# Patient Record
Sex: FEMALE | Race: WHITE | NOT HISPANIC OR LATINO | Employment: OTHER | URBAN - METROPOLITAN AREA
[De-identification: names, ages, dates, MRNs, and addresses within clinical notes are randomized per-mention and may not be internally consistent; named-entity substitution may affect disease eponyms.]

---

## 2017-02-28 ENCOUNTER — APPOINTMENT (OUTPATIENT)
Dept: OCCUPATIONAL MEDICINE | Facility: CLINIC | Age: 57
End: 2017-02-28

## 2017-02-28 PROCEDURE — 99000 SPECIMEN HANDLING OFFICE-LAB: CPT | Performed by: PHYSICIAN ASSISTANT

## 2018-03-19 ENCOUNTER — OFFICE VISIT (OUTPATIENT)
Dept: FAMILY MEDICINE | Facility: CLINIC | Age: 58
End: 2018-03-19
Payer: COMMERCIAL

## 2018-03-19 DIAGNOSIS — J20.9 ACUTE BRONCHITIS, UNSPECIFIED ORGANISM: Primary | ICD-10-CM

## 2018-03-19 PROCEDURE — 99213 OFFICE O/P EST LOW 20 MIN: CPT | Performed by: NURSE PRACTITIONER

## 2018-03-19 RX ORDER — ALBUTEROL SULFATE 90 UG/1
2 AEROSOL, METERED RESPIRATORY (INHALATION) EVERY 6 HOURS PRN
Qty: 1 INHALER | Refills: 0 | Status: SHIPPED | OUTPATIENT
Start: 2018-03-19

## 2018-03-19 RX ORDER — PREDNISONE 20 MG/1
TABLET ORAL
Qty: 10 TABLET | Refills: 0 | Status: SHIPPED | OUTPATIENT
Start: 2018-03-19 | End: 2023-02-13

## 2018-03-19 ASSESSMENT — MIFFLIN-ST. JEOR: SCORE: 1100.81

## 2018-03-19 NOTE — MR AVS SNAPSHOT
After Visit Summary   3/19/2018    Antoinette Nam    MRN: [id number]           Patient Information     Date Of Birth          1960        Visit Information        Provider Department      3/19/2018 4:40 PM Zuleyka Bridges APRN [facility]        Today's Diagnoses     Acute bronchitis, unspecified organism    -  1      Care Instructions      Bronchitis, Viral (Adult)    You have a viral bronchitis. Bronchitis is inflammation and swelling of the lining of the lungs. This is often caused by an infection. Symptoms include a dry, hacking cough that is worse at night. The cough may bring up yellow-green mucus. You may also feel short of breath or wheeze. Other symptoms may include tiredness, chest discomfort, and chills.  Bronchitis that is caused by a virus is not treated with antibiotics. Instead, medicines may be given to help relieve symptoms. Symptoms can last up to 2 weeks, although the cough may last much longer.  This illness is contagious during the first few days and is spread through the air by coughing and sneezing, or by direct contact (touching the sick person and then touching your own eyes, nose, or mouth).  Most viral illnesses resolve within 10 to 14 days with rest and simple home remedies, although they may sometimes last for several weeks.  Home care    If symptoms are severe, rest at home for the first 2 to 3 days. When you go back to your usual activities, don't let yourself get too tired.    Do not smoke. Also avoid being exposed to secondhand smoke.    You may use over-the-counter medicine to control fever or pain, unless another pain medicine was prescribed. (Note: If you have chronic liver or kidney disease or have ever had a stomach ulcer or gastrointestinal bleeding, talk with your healthcare provider before using these medicines. Also talk to your provider if you are taking medicine to prevent blood clots.) Aspirin should never be given to anyone  younger than 18 years of age who is ill with a viral infection or fever. It may cause severe liver or brain damage.    Your appetite may be poor, so a light diet is fine. Avoid dehydration by drinking 6 to 8 glasses of fluids per day (such as water, soft drinks, sports drinks, juices, tea, or soup). Extra fluids will help loosen secretions in the nose and lungs.    Over-the-counter cough, cold, and sore-throat medicines will not shorten the length of the illness, but they may help to reduce symptoms. (Note: Do not use decongestants if you have high blood pressure.)  Follow-up care  Follow up with your healthcare provider, or as advised. If you had an X-ray or ECG (electrocardiogram), a specialist will review it. You will be notified of any new findings that may affect your care.  Note: If you are age 65 or older, or if you have a chronic lung disease or condition that affects your immune system, or you smoke, talk to your healthcare provider about having pneumococcal vaccinations and a yearly influenza vaccination (flu shot).  When to seek medical advice  Call your healthcare provider right away if any of these occur:    Fever of 100.4 F (38 C) or higher    Coughing up increased amounts of colored sputum    Weakness, drowsiness, headache, facial pain, ear pain, or a stiff neck  Call 911, or get immediate medical care  Contact emergency services right away if any of these occur:    Coughing up blood    Worsening weakness, drowsiness, headache, or stiff neck    Trouble breathing, wheezing, or pain with breathing  Date Last Reviewed: 9/13/2015 2000-2017 [employer]. [address] [place] [address]. All rights reserved. This information is not intended as a substitute for professional medical care. Always follow your healthcare professional's instructions.                Follow-ups after your visit        Who to contact     If you have questions or need follow up information about today's  clinic visit or your schedule please [facility] at [phone].  Normal or non-critical lab and imaging results will be communicated to you by Buttonhart, letter or phone within 4 business days after the clinic has received the results. If you do not hear from us within 7 days, please contact the clinic through Touchtalentt or phone. If you have a critical or abnormal lab result, we will notify you by phone as soon as possible.  Submit refill requests through [employer] or call your pharmacy and they will forward the refill request to us. Please allow 3 business days for your refill to be completed.          Additional Information About Your Visit        ButtonharWineSimple Information     [employer] gives you secure access to your electronic health record. If you see a primary care provider, you can also send messages to your care team and make appointments. If you have questions, please call your primary care clinic.  If you do not have a primary care provider, please call [phone] and they will assist you.        Care EveryWhere ID     This is your Care EveryWhere ID. This could be used by other organizations to access your [facility] medical records  [id number]        Your Vitals Were     Pulse Temperature Respirations BMI (Body Mass Index)          72 97.6  F (36.4  C) (Tympanic) 22 22.32 kg/m2         Blood Pressure from Last 3 Encounters:   03/19/18 100/72   11/21/15 116/85   11/06/15 131/72    Weight from Last 3 Encounters:   03/19/18 126 lb (57.2 kg)   11/21/15 123 lb (55.8 kg)   05/04/14 125 lb (56.7 kg)              Today, you had the following     No orders found for display         Today's Medication Changes          These changes are accurate as of 3/19/18  5:04 PM.  If you have any questions, ask your nurse or doctor.               Start taking these medicines.        Dose/Directions    albuterol 108 (90 BASE) MCG/ACT Inhaler   Commonly known as:  PROAIR HFA/PROVENTIL HFA/VENTOLIN HFA    Used for:  Acute bronchitis, unspecified organism   Started by:  Zuleyka Bridges APRN CNP        Dose:  2 puff   Inhale 2 puffs into the lungs every 6 hours as needed for shortness of breath / dyspnea or wheezing   Quantity:  1 Inhaler   Refills:  0       predniSONE 20 MG tablet   Commonly known as:  DELTASONE   Used for:  Acute bronchitis, unspecified organism   Started by:  Zuleyka Bridges APRN CNP        Take one tablet twice a day for 5 days.   Quantity:  10 tablet   Refills:  0            Where to get your medicines      These medications were sent to [id number] - [facility] [address]  [address] [facility] [id number]    Hours:  Closed 10-16-08 business to [facility] Phone:  [phone]     albuterol 108 (90 BASE) MCG/ACT Inhaler    predniSONE 20 MG tablet                Primary Care Provider Office Phone # Fax #    Brita L Wegener [phone]       [facility] [address] [address]        Equal Access to Services     [facility]: Hadii keila ku hadasho Soomaali, waaxda luqadaha, qaybta kaalmada adeegyada, waxay christianoin emily hu . So [facility] [phone].    ATENCIÓN: Si habla español, tiene a rosales disposición servicios gratuitos de asistencia lingüística. Llame al [phone].    We comply with applicable federal civil rights laws and [place] laws. We do not discriminate on the basis of race, color, national origin, age, disability, sex, sexual orientation, or gender identity.            Thank you!     Thank you for choosing [facility]  for your care. Our goal is always to provide you with excellent care. Hearing back from our patients is one way we can continue to improve our services. Please take a few minutes to complete the written survey that you may receive in the mail after your visit with us. Thank you!             Your Updated Medication List - Protect others around you: Learn  how to safely use, store and throw away your medicines at www.disposemymeds.org.          This list is accurate as of 3/19/18  5:04 PM.  Always use your most recent med list.                   Brand Name Dispense Instructions for use Diagnosis    albuterol 108 (90 BASE) MCG/ACT Inhaler    PROAIR HFA/PROVENTIL HFA/VENTOLIN HFA    1 Inhaler    Inhale 2 puffs into the lungs every 6 hours as needed for shortness of breath / dyspnea or wheezing    Acute bronchitis, unspecified organism       BACLOFEN PO      Take 10 mg by mouth at onset of headache for muscle spasms        CELEBREX PO      Take by mouth at onset of headache for moderate pain        gabapentin 100 MG capsule    NEURONTIN    90 capsule    Take 2 capsules (200 mg) by mouth 3 times daily        ibuprofen 200 MG tablet    ADVIL/MOTRIN     800MG=4 TABLETS ORALLY 3 TIMES A DAY FOR 1 WEEK        predniSONE 20 MG tablet    DELTASONE    10 tablet    Take one tablet twice a day for 5 days.    Acute bronchitis, unspecified organism

## 2018-03-19 NOTE — PATIENT INSTRUCTIONS

## 2018-03-19 NOTE — PROGRESS NOTES
SUBJECTIVE:   Antoinette Nam is a 57 year old female who presents to clinic today for the following health issues:      Cough       Duration: Saturday night     Description (location/character/radiation): cough     Intensity:  mild, moderate    Accompanying signs and symptoms: wheezing, shortness of breath, burning in chest, phlegm at night, coughing is constant, chest feels heavy, sinus pressure and headache, nausea      History (similar episodes/previous evaluation): friend of the patient carved moose antlers. Patient inhaled bone dust.     Precipitating or alleviating factors: None    Therapies tried and outcome: cough syrup, albuterol nebulizer- helps a little       Problem list and histories reviewed & adjusted, as indicated.  Additional history: as documented    There is no problem list on file for this patient.    Past Surgical History:   Procedure Laterality Date     ARTHROSCOPY KNEE RT/LT  1986/1988     CARPAL TUNNEL RELEASE RT/LT  2004     HYSTERECTOMY, JAZZ  1997     LAMINECT/DISCECTOMY, LUMBAR  1997-C5 C6    Laminectomy/Discectomy Cervical     SALPINGO OOPHORECTOMY,R/L/EDMAR  1987    Salpingo Oophorectomy, RT/LT/EDMAR       Social History   Substance Use Topics     Smoking status: Current Every Day Smoker     Smokeless tobacco: Never Used      Comment: socially     Alcohol use Yes      Comment: wine cooler/beer once a month     Family History   Problem Relation Age of Onset     CANCER Mother      bladder     Arthritis Mother      degenerative     Hypertension Mother      HEART DISEASE Father      pacemaker     Lipids Father      CANCER Maternal Grandmother      gallbladder     HEART DISEASE Maternal Grandfather      CANCER Paternal Grandmother      uterine     HEART DISEASE Paternal Grandfather      Neurologic Disorder Brother      MS         Current Outpatient Prescriptions   Medication Sig Dispense Refill     BACLOFEN PO Take 10 mg by mouth at onset of headache for muscle spasms       Celecoxib  (CELEBREX PO) Take by mouth at onset of headache for moderate pain       gabapentin (NEURONTIN) 100 MG capsule Take 2 capsules (200 mg) by mouth 3 times daily (Patient not taking: Reported on 3/19/2018) 90 capsule 0     IBUPROFEN 200 MG PO TABS 800MG=4 TABLETS ORALLY 3 TIMES A DAY FOR 1 WEEK       Allergies   Allergen Reactions     Macrobid [Nitrofuran Derivatives]      Morphine      Percocet [Oxycodone-Acetaminophen] Nausea and Vomiting       Reviewed and updated as needed this visit by clinical staff       Reviewed and updated as needed this visit by Provider         ROS:  Constitutional, HEENT, cardiovascular, pulmonary, gi and gu systems are negative, except as otherwise noted.    OBJECTIVE:     /72  Pulse 72  Temp 97.6  F (36.4  C) (Tympanic)  Resp 22  Wt 126 lb (57.2 kg)  BMI 22.32 kg/m2  Body mass index is 22.32 kg/(m^2).  GENERAL: healthy, alert and no distress  EYES: Eyes grossly normal to inspection, PERRL and conjunctivae and sclerae normal  HENT: ear canals and TM's normal, nose and mouth without ulcers or lesions  NECK: no adenopathy, no asymmetry, masses, or scars and thyroid normal to palpation  RESP: lungs clear to auscultation - no rales, rhonchi or wheezes  CV: regular rate and rhythm, normal S1 S2, no S3 or S4, no murmur, click or rub, no peripheral edema and peripheral pulses strong  MS: no gross musculoskeletal defects noted, no edema  SKIN: no suspicious lesions or rashes  NEURO: Normal strength and tone, mentation intact and speech normal  PSYCH: mentation appears normal, affect normal/bright      ASSESSMENT/PLAN:       ICD-10-CM    1. Acute bronchitis, unspecified organism J20.9 predniSONE (DELTASONE) 20 MG tablet     albuterol (PROAIR HFA/PROVENTIL HFA/VENTOLIN HFA) 108 (90 BASE) MCG/ACT Inhaler     Patient Instructions     Bronchitis, Viral (Adult)    You have a viral bronchitis. Bronchitis is inflammation and swelling of the lining of the lungs. This is often caused by an  infection. Symptoms include a dry, hacking cough that is worse at night. The cough may bring up yellow-green mucus. You may also feel short of breath or wheeze. Other symptoms may include tiredness, chest discomfort, and chills.  Bronchitis that is caused by a virus is not treated with antibiotics. Instead, medicines may be given to help relieve symptoms. Symptoms can last up to 2 weeks, although the cough may last much longer.  This illness is contagious during the first few days and is spread through the air by coughing and sneezing, or by direct contact (touching the sick person and then touching your own eyes, nose, or mouth).  Most viral illnesses resolve within 10 to 14 days with rest and simple home remedies, although they may sometimes last for several weeks.  Home care    If symptoms are severe, rest at home for the first 2 to 3 days. When you go back to your usual activities, don't let yourself get too tired.    Do not smoke. Also avoid being exposed to secondhand smoke.    You may use over-the-counter medicine to control fever or pain, unless another pain medicine was prescribed. (Note: If you have chronic liver or kidney disease or have ever had a stomach ulcer or gastrointestinal bleeding, talk with your healthcare provider before using these medicines. Also talk to your provider if you are taking medicine to prevent blood clots.) Aspirin should never be given to anyone younger than 18 years of age who is ill with a viral infection or fever. It may cause severe liver or brain damage.    Your appetite may be poor, so a light diet is fine. Avoid dehydration by drinking 6 to 8 glasses of fluids per day (such as water, soft drinks, sports drinks, juices, tea, or soup). Extra fluids will help loosen secretions in the nose and lungs.    Over-the-counter cough, cold, and sore-throat medicines will not shorten the length of the illness, but they may help to reduce symptoms. (Note: Do not use decongestants if  you have high blood pressure.)  Follow-up care  Follow up with your healthcare provider, or as advised. If you had an X-ray or ECG (electrocardiogram), a specialist will review it. You will be notified of any new findings that may affect your care.  Note: If you are age 65 or older, or if you have a chronic lung disease or condition that affects your immune system, or you smoke, talk to your healthcare provider about having pneumococcal vaccinations and a yearly influenza vaccination (flu shot).  When to seek medical advice  Call your healthcare provider right away if any of these occur:    Fever of 100.4 F (38 C) or higher    Coughing up increased amounts of colored sputum    Weakness, drowsiness, headache, facial pain, ear pain, or a stiff neck  Call 911, or get immediate medical care  Contact emergency services right away if any of these occur:    Coughing up blood    Worsening weakness, drowsiness, headache, or stiff neck    Trouble breathing, wheezing, or pain with breathing  Date Last Reviewed: 9/13/2015 2000-2017 [employer]. [address] [place] [address]. All rights reserved. This information is not intended as a substitute for professional medical care. Always follow your healthcare professional's instructions.            AXEL [facility]

## 2018-04-24 ENCOUNTER — TELEPHONE (OUTPATIENT)
Dept: FAMILY MEDICINE | Facility: CLINIC | Age: 58
End: 2018-04-24

## 2018-04-24 NOTE — TELEPHONE ENCOUNTER
Reviewing charts. Patient is due for a mammogram and a colon cancer screening.    Abstracted mammogram done at [facility] on 5/17/17.    Sara Carballo MA

## 2019-07-01 VITALS
HEART RATE: 72 BPM | SYSTOLIC BLOOD PRESSURE: 100 MMHG | HEIGHT: 62 IN | TEMPERATURE: 97.6 F | WEIGHT: 126 LBS | BODY MASS INDEX: 23.19 KG/M2 | RESPIRATION RATE: 22 BRPM | DIASTOLIC BLOOD PRESSURE: 72 MMHG

## 2019-10-04 ENCOUNTER — HEALTH MAINTENANCE LETTER (OUTPATIENT)
Age: 59
End: 2019-10-04

## 2020-11-14 ENCOUNTER — HEALTH MAINTENANCE LETTER (OUTPATIENT)
Age: 60
End: 2020-11-14

## 2021-09-12 ENCOUNTER — HEALTH MAINTENANCE LETTER (OUTPATIENT)
Age: 61
End: 2021-09-12

## 2021-11-07 ENCOUNTER — HEALTH MAINTENANCE LETTER (OUTPATIENT)
Age: 61
End: 2021-11-07

## 2022-01-02 ENCOUNTER — HEALTH MAINTENANCE LETTER (OUTPATIENT)
Age: 62
End: 2022-01-02

## 2022-02-09 ENCOUNTER — HOSPITAL ENCOUNTER (OUTPATIENT)
Dept: CT IMAGING | Facility: CLINIC | Age: 62
Discharge: HOME | End: 2022-02-09
Attending: PHYSICIAN ASSISTANT | Admitting: PHYSICIAN ASSISTANT
Payer: COMMERCIAL

## 2022-02-09 DIAGNOSIS — R42 DIZZINESS: ICD-10-CM

## 2022-02-09 PROCEDURE — 70450 CT HEAD/BRAIN W/O DYE: CPT

## 2022-02-27 ENCOUNTER — HEALTH MAINTENANCE LETTER (OUTPATIENT)
Age: 62
End: 2022-02-27

## 2022-11-19 ENCOUNTER — HEALTH MAINTENANCE LETTER (OUTPATIENT)
Age: 62
End: 2022-11-19

## 2023-02-11 ENCOUNTER — HOSPITAL ENCOUNTER (EMERGENCY)
Facility: CLINIC | Age: 63
Discharge: HOME | End: 2023-02-11
Payer: COMMERCIAL

## 2023-02-11 VITALS
OXYGEN SATURATION: 98 % | BODY MASS INDEX: 23.41 KG/M2 | WEIGHT: 124 LBS | HEIGHT: 61 IN | DIASTOLIC BLOOD PRESSURE: 83 MMHG | SYSTOLIC BLOOD PRESSURE: 119 MMHG | HEART RATE: 108 BPM | RESPIRATION RATE: 20 BRPM | TEMPERATURE: 98.2 F

## 2023-02-11 ASSESSMENT — ACTIVITIES OF DAILY LIVING (ADL)
ADLS_ACUITY_SCORE: 35
ADLS_ACUITY_SCORE: 35

## 2023-02-11 NOTE — ED NOTES
Patient presented to the urgent care for complaints of ear pain and dizziness.  Patient states that yesterday she had severe dizziness with vomiting.  I discussed with the patient that I cannot exclusively rule out more significant or serious causes for dizziness such as stroke in the urgent care due to the limited resources.  Patient states that she was hoping for more thorough evaluation than what the urgent care can provide.  Patient states she would like to be seen in the emergency department for further evaluation.  Patient was moved out to the emergency department waiting room.      Kash Hernandez, AXEL CNP  02/11/23 [id number]

## 2023-02-13 ENCOUNTER — APPOINTMENT (OUTPATIENT)
Dept: GENERAL RADIOLOGY | Facility: CLINIC | Age: 63
End: 2023-02-13
Attending: EMERGENCY MEDICINE
Payer: COMMERCIAL

## 2023-02-13 ENCOUNTER — HOSPITAL ENCOUNTER (EMERGENCY)
Facility: CLINIC | Age: 63
Discharge: HOME | End: 2023-02-13
Attending: EMERGENCY MEDICINE | Admitting: EMERGENCY MEDICINE
Payer: COMMERCIAL

## 2023-02-13 VITALS
WEIGHT: 124 LBS | TEMPERATURE: 98.2 F | DIASTOLIC BLOOD PRESSURE: 85 MMHG | SYSTOLIC BLOOD PRESSURE: 106 MMHG | RESPIRATION RATE: 16 BRPM | HEIGHT: 61 IN | HEART RATE: 72 BPM | BODY MASS INDEX: 23.41 KG/M2 | OXYGEN SATURATION: 97 %

## 2023-02-13 DIAGNOSIS — R07.9 CHEST PAIN, UNSPECIFIED TYPE: ICD-10-CM

## 2023-02-13 DIAGNOSIS — J20.9 ACUTE BRONCHITIS, UNSPECIFIED ORGANISM: ICD-10-CM

## 2023-02-13 DIAGNOSIS — H93.8X2 SENSATION OF FULLNESS IN LEFT EAR: ICD-10-CM

## 2023-02-13 LAB
ALBUMIN SERPL BCG-MCNC: 4.1 G/DL (ref 3.5–5.2)
ALP SERPL-CCNC: 100 U/L (ref 35–104)
ALT SERPL W P-5'-P-CCNC: 11 U/L (ref 10–35)
ANION GAP SERPL CALCULATED.3IONS-SCNC: 11 MMOL/L (ref 7–15)
AST SERPL W P-5'-P-CCNC: 21 U/L (ref 10–35)
BASOPHILS # BLD MANUAL: 0 10E3/UL (ref 0–0.2)
BASOPHILS NFR BLD MANUAL: 0 %
BILIRUB SERPL-MCNC: 0.2 MG/DL
BUN SERPL-MCNC: 13.7 MG/DL (ref 8–23)
CALCIUM SERPL-MCNC: 9.1 MG/DL (ref 8.8–10.2)
CHLORIDE SERPL-SCNC: 106 MMOL/L (ref 98–107)
CREAT SERPL-MCNC: 0.83 MG/DL (ref 0.51–0.95)
DEPRECATED HCO3 PLAS-SCNC: 24 MMOL/L (ref 22–29)
EOSINOPHIL # BLD MANUAL: 0.2 10E3/UL (ref 0–0.7)
EOSINOPHIL NFR BLD MANUAL: 3 %
ERYTHROCYTE [DISTWIDTH] IN BLOOD BY AUTOMATED COUNT: 13.4 % (ref 10–15)
GFR SERPL CREATININE-BSD FRML MDRD: 79 ML/MIN/1.73M2
GLUCOSE SERPL-MCNC: 106 MG/DL (ref 70–99)
HCT VFR BLD AUTO: 37.1 % (ref 35–47)
HGB BLD-MCNC: 12.8 G/DL (ref 11.7–15.7)
HOLD SPECIMEN: NORMAL
HOLD SPECIMEN: NORMAL
LIPASE SERPL-CCNC: 20 U/L (ref 13–60)
LYMPHOCYTES # BLD MANUAL: 3.3 10E3/UL (ref 0.8–5.3)
LYMPHOCYTES NFR BLD MANUAL: 44 %
MCH RBC QN AUTO: 31.6 PG (ref 26.5–33)
MCHC RBC AUTO-ENTMCNC: 34.5 G/DL (ref 31.5–36.5)
MCV RBC AUTO: 92 FL (ref 78–100)
MONOCYTES # BLD MANUAL: 1.1 10E3/UL (ref 0–1.3)
MONOCYTES NFR BLD MANUAL: 14 %
NEUTROPHILS # BLD MANUAL: 2.9 10E3/UL (ref 1.6–8.3)
NEUTROPHILS NFR BLD MANUAL: 39 %
NT-PROBNP SERPL-MCNC: 43 PG/ML (ref 0–900)
PLAT MORPH BLD: ABNORMAL
PLATELET # BLD AUTO: 288 10E3/UL (ref 150–450)
POTASSIUM SERPL-SCNC: 4 MMOL/L (ref 3.4–5.3)
PROT SERPL-MCNC: 7.2 G/DL (ref 6.4–8.3)
RBC # BLD AUTO: 4.05 10E6/UL (ref 3.8–5.2)
RBC MORPH BLD: ABNORMAL
SODIUM SERPL-SCNC: 141 MMOL/L (ref 136–145)
TROPONIN T SERPL HS-MCNC: 9 NG/L
TROPONIN T SERPL HS-MCNC: <6 NG/L
VARIANT LYMPHS BLD QL SMEAR: PRESENT
WBC # BLD AUTO: 7.5 10E3/UL (ref 4–11)

## 2023-02-13 PROCEDURE — 83690 ASSAY OF LIPASE: CPT | Performed by: EMERGENCY MEDICINE

## 2023-02-13 PROCEDURE — 83880 ASSAY OF NATRIURETIC PEPTIDE: CPT | Performed by: EMERGENCY MEDICINE

## 2023-02-13 PROCEDURE — 71046 X-RAY EXAM CHEST 2 VIEWS: CPT

## 2023-02-13 PROCEDURE — 80053 COMPREHEN METABOLIC PANEL: CPT | Performed by: EMERGENCY MEDICINE

## 2023-02-13 PROCEDURE — 84484 ASSAY OF TROPONIN QUANT: CPT | Performed by: EMERGENCY MEDICINE

## 2023-02-13 PROCEDURE — 85007 BL SMEAR W/DIFF WBC COUNT: CPT | Performed by: EMERGENCY MEDICINE

## 2023-02-13 PROCEDURE — 93005 ELECTROCARDIOGRAM TRACING: CPT | Performed by: EMERGENCY MEDICINE

## 2023-02-13 PROCEDURE — 99285 EMERGENCY DEPT VISIT HI MDM: CPT | Mod: 25 | Performed by: EMERGENCY MEDICINE

## 2023-02-13 PROCEDURE — 36415 COLL VENOUS BLD VENIPUNCTURE: CPT | Performed by: EMERGENCY MEDICINE

## 2023-02-13 PROCEDURE — 99284 EMERGENCY DEPT VISIT MOD MDM: CPT | Mod: 25 | Performed by: EMERGENCY MEDICINE

## 2023-02-13 PROCEDURE — 93010 ELECTROCARDIOGRAM REPORT: CPT | Performed by: EMERGENCY MEDICINE

## 2023-02-13 PROCEDURE — 85027 COMPLETE CBC AUTOMATED: CPT | Performed by: EMERGENCY MEDICINE

## 2023-02-13 RX ORDER — PREDNISONE 20 MG/1
TABLET ORAL
Qty: 10 TABLET | Refills: 0 | Status: SHIPPED | OUTPATIENT
Start: 2023-02-13

## 2023-02-13 ASSESSMENT — ACTIVITIES OF DAILY LIVING (ADL)
ADLS_ACUITY_SCORE: 35
ADLS_ACUITY_SCORE: 35

## 2023-02-13 NOTE — ED TRIAGE NOTES
"Pt states she is having burning in the center of her chest for 2 months.  Was on prednisone, and antibiotic for ear \"plugged\".   Pt didn't follow up.  Pt states Friday felt \"floaty' while driving her car, and then vomited.  Pt was here on Saturday and left before being seen.      "

## 2023-02-13 NOTE — ED PROVIDER NOTES
"  History     Chief Complaint   Patient presents with     Ear Fullness     Pleurisy     Chest \"burning\"     HPI  Antoinette Nam is a 62 year old female with a past medical history significant for occipital neuralgia hyperlipidemia lower back pain who presents emergency department complaining of ear fullness episode of dizziness and cough with chest pressure going on for the last 2 months.  Patient states she has had ear issues going on for the past 2 months and also has developed some chest pressure that has been present described as a burning sensation for the past month.  Does not change with activity has had a mild nonproductive cough.  Also had fullness in ears especially on the left patient was seen by her primary care clinic on the 30th and given a Zithromax and steroids.  She says this did not do much for her.  She continues to have the ear fullness and cough.  She she is also having the chest burning.  Patient on Friday was driving home and had the sudden onset of dizziness and pulled over.  She was able to get home but had significant spinning and vomited several times and felt dizziness for several hours after this she went to bed and woke up and was feeling better Saturday and has not had a return of this.  She denies any severe headaches or visual changes at this time she is having a mild burning in her chest denies shortness of breath has not had a productive cough the cough is nonproductive.  She denies any abdominal pain back pain focal numbness weakness in any extremity she has not had bowel or bladder dysfunction.  She denies any calf pain.  She has not had a rash.    Allergies:  Allergies   Allergen Reactions     Macrobid [Nitrofuran Derivatives]      Morphine      Percocet [Oxycodone-Acetaminophen] Nausea and Vomiting       Problem List:    There are no problems to display for this patient.       Past Medical History:    No past medical history on file.    Past Surgical History:    Past Surgical " "History:   Procedure Laterality Date     ARTHROSCOPY KNEE RT/LT  1986/1988     CARPAL TUNNEL RELEASE RT/LT  2004     HYSTERECTOMY, JAZZ  1997     LAMINECT/DISCECTOMY, LUMBAR  1997-C5 C6    Laminectomy/Discectomy Cervical     SALPINGO OOPHORECTOMY,R/L/EDMAR  1987    Salpingo Oophorectomy, RT/LT/EDMAR       Family History:    Family History   Problem Relation Age of Onset     Cancer Mother         bladder     Arthritis Mother         degenerative     Hypertension Mother      Heart Disease Father         pacemaker     Lipids Father      Cancer Maternal Grandmother         gallbladder     Heart Disease Maternal Grandfather      Cancer Paternal Grandmother         uterine     Heart Disease Paternal Grandfather      Neurologic Disorder Brother         MS       Social History:  Marital Status:  Single [1]  Social History     Tobacco Use     Smoking status: Some Days     Types: Cigarettes     Smokeless tobacco: Never     Tobacco comments:     socially   Substance Use Topics     Alcohol use: Yes     Comment: wine cooler/beer once a month        Medications:    albuterol (PROAIR HFA/PROVENTIL HFA/VENTOLIN HFA) 108 (90 BASE) MCG/ACT Inhaler  BACLOFEN PO  Celecoxib (CELEBREX PO)  gabapentin (NEURONTIN) 100 MG capsule  IBUPROFEN 200 MG PO TABS  predniSONE (DELTASONE) 20 MG tablet          Review of Systems  All systems reviewed and other than pertinent positives and negatives in HPI all other systems are negative.  Physical Exam   BP: 111/77  Pulse: 89  Temp: 98.2  F (36.8  C)  Resp: 16  Height: 154.9 cm (5' 1\")  Weight: 56.2 kg (124 lb)  SpO2: 96 %      Physical Exam  Vitals and nursing note reviewed.   Constitutional:       General: She is not in acute distress.     Appearance: Normal appearance. She is not ill-appearing, toxic-appearing or diaphoretic.   HENT:      Right Ear: Tympanic membrane normal.      Ears:      Comments: Left TM has fluid behind it but no erythema or dullness.  Canals are normal bilaterally.     Nose: Nose " normal.      Mouth/Throat:      Mouth: Mucous membranes are moist.      Pharynx: Oropharynx is clear.   Eyes:      Extraocular Movements: Extraocular movements intact.      Conjunctiva/sclera: Conjunctivae normal.   Cardiovascular:      Rate and Rhythm: Normal rate and regular rhythm.      Pulses: Normal pulses.      Heart sounds: Normal heart sounds. No murmur heard.  Pulmonary:      Effort: Pulmonary effort is normal.      Breath sounds: Normal breath sounds. No stridor. No wheezing or rhonchi.   Abdominal:      General: Abdomen is flat. Bowel sounds are normal. There is no distension.      Palpations: Abdomen is soft.      Tenderness: There is no abdominal tenderness. There is no right CVA tenderness or left CVA tenderness.   Musculoskeletal:         General: No swelling or tenderness. Normal range of motion.      Cervical back: Normal range of motion and neck supple.      Right lower leg: No edema.      Left lower leg: No edema.   Skin:     General: Skin is warm and dry.      Capillary Refill: Capillary refill takes less than 2 seconds.      Findings: No rash.   Neurological:      General: No focal deficit present.      Mental Status: She is alert and oriented to person, place, and time.      Cranial Nerves: No cranial nerve deficit.      Sensory: No sensory deficit.      Motor: No weakness.      Coordination: Coordination normal.   Psychiatric:         Mood and Affect: Mood normal.         ED Course                 Procedures              EKG Interpretation:      Interpreted by Jose C Farr MD  Rhythm: normal sinus   Rate: normal  Axis: normal  Ectopy: none  Conduction: normal  ST Segments/ T Waves: No ST-T wave changes  Q Waves: none  Comparison to prior: Unchanged    Clinical Impression: normal EKG          Critical Care time:  none               Labs Ordered and Resulted from Time of ED Arrival to Time of ED Departure   COMPREHENSIVE METABOLIC PANEL - Abnormal       Result Value    Sodium 141       Potassium 4.0      Chloride 106      Carbon Dioxide (CO2) 24      Anion Gap 11      Urea Nitrogen 13.7      Creatinine 0.83      Calcium 9.1      Glucose 106 (*)     Alkaline Phosphatase 100      AST 21      ALT 11      Protein Total 7.2      Albumin 4.1      Bilirubin Total 0.2      GFR Estimate 79     DIFFERENTIAL - Abnormal    % Neutrophils 39      % Lymphocytes 44      % Monocytes 14      % Eosinophils 3      % Basophils 0      Absolute Neutrophils 2.9      Absolute Lymphocytes 3.3      Absolute Monocytes 1.1      Absolute Eosinophils 0.2      Absolute Basophils 0.0      RBC Morphology Confirmed RBC Indices      Platelet Assessment        Value: Automated Count Confirmed. Platelet morphology is normal.    Reactive Lymphocytes Present (*)    LIPASE - Normal    Lipase 20     TROPONIN T, HIGH SENSITIVITY - Normal    Troponin T, High Sensitivity 9     NT PROBNP INPATIENT - Normal    N terminal Pro BNP Inpatient 43     CBC WITH PLATELETS AND DIFFERENTIAL - Normal    WBC Count 7.5      RBC Count 4.05      Hemoglobin 12.8      Hematocrit 37.1      MCV 92      MCH 31.6      MCHC 34.5      RDW 13.4      Platelet Count 288     TROPONIN T, HIGH SENSITIVITY - Normal    Troponin T, High Sensitivity <6         Medications - No data to display  Results for orders placed or performed during the hospital encounter of 02/13/23   Chest XR,  PA & LAT    Narrative    EXAM: XR CHEST 2 VIEWS  LOCATION: [facility]  DATE/TIME: 2/13/2023 6:58 PM    INDICATION: Chest pressure.  COMPARISON: None.      Impression    IMPRESSION: Negative chest.       Assessments & Plan (with Medical Decision Making) records from recent visit with primary care were reviewed as well as past medical history medications.  EKG and labs were obtained.  EKG was unremarkable.  CBC without significant abnormality.  Lipase was 20.  proBNP 43.  Troponin less than 6.  A chest x-ray was obtained and revealed no obvious acute abnormality.  I  reviewed this myself.  Patient was observed for some time she is not wheezing at this time she has an inhaler at home.  She definitely has some fluid behind her left ear and is congested.  The episode she had on Friday sounds like a vertigo situation.  Patient has previously been on steroids and states this does help clear up some of her chest difficulties I feel she should continue to try over-the-counter medications for some time and if this does not work try the steroids.  I am going to refer her to ENT for further evaluation of the fluid behind her ears.  If any further chest pain shortness of breath weakness fevers or other symptoms patient should immediately return for further evaluation and care.  Patient is comfortable with this plan.     I have reviewed the nursing notes.    I have reviewed the findings, diagnosis, plan and need for follow up with the patient.         Discharge Medication List as of 2/13/2023  9:21 PM          Final diagnoses:   Sensation of fullness in left ear   Chest pain, unspecified type       2/13/2023   [facility] EMERGENCY DEPT     Chika, Jose C Fernandes MD  02/15/23 0913

## 2023-02-14 NOTE — DISCHARGE INSTRUCTIONS
Return if symptoms worsen or new symptoms develop.  Follow-up with primary care physician next available.  Drink plenty of fluids.  Return if symptoms worsen or new symptoms develop.  Follow-up with primary care physician next available.  Drink plenty of fluids.  If increased fever dizziness worsening congestion chest pain persistent or other symptoms present please return for recheck follow-up with your primary care for further evaluation this week and I have made a referral to ENT.  No evidence of obvious bacterial infection is present.

## 2023-02-15 ENCOUNTER — TELEPHONE (OUTPATIENT)
Dept: OTOLARYNGOLOGY | Facility: CLINIC | Age: 63
End: 2023-02-15

## 2023-02-15 NOTE — TELEPHONE ENCOUNTER
"Scheduled to see Dr. Wise + Audio on 6/5/23. We do not have any openings in 1-2 weeks.    Patient seen in the ED on 2/13/23.    Has had ear issues x 2 months per patient report: ear fullness, dizziness, 1 episode of repetitive vomiting.    \"Patient on Friday was driving home and had the sudden onset of dizziness and pulled over.  She was able to get home but had significant spinning and vomited several times and felt dizziness for several hours after this she went to bed and woke up and was feeling better Saturday and has not had a return of this.\"    Will route to [facility] and see if they would see patient for possible vestibular issues.    Yoselin Cohen RN on 2/15/2023 at 12:40 PM    "

## 2023-02-15 NOTE — TELEPHONE ENCOUNTER
[facility] Call Center    Phone Message    May a detailed message be left on voicemail: yes     Reason for Call: Appointment Intake    Referring Provider Name: Jose C Farr MD  Diagnosis and/or Symptoms: Sensation of fullness in left ear [H93.8X2] Priority: 1-2 Weeks    Referral states priority 1-2 weeks, added patient to waitlist     Action Taken: Message routed to:  Other: [facility] [facility]    Travel Screening: Not Applicable

## 2023-02-16 NOTE — TELEPHONE ENCOUNTER
2/16 LVM to call back to schedule sooner appointment?  She can always separate appointments to get in sooner with Dr. Reid.  Dr. Reid has new vestibular spots on her schedule that are open.  So all she would need is that audio first for the dizziness.   LT

## 2023-02-18 ENCOUNTER — HOSPITAL ENCOUNTER (EMERGENCY)
Facility: CLINIC | Age: 63
Discharge: HOME | End: 2023-02-19
Attending: EMERGENCY MEDICINE | Admitting: EMERGENCY MEDICINE
Payer: COMMERCIAL

## 2023-02-18 DIAGNOSIS — H83.02 LABYRINTHITIS OF LEFT EAR: ICD-10-CM

## 2023-02-18 PROCEDURE — 99282 EMERGENCY DEPT VISIT SF MDM: CPT | Performed by: EMERGENCY MEDICINE

## 2023-02-18 ASSESSMENT — ACTIVITIES OF DAILY LIVING (ADL): ADLS_ACUITY_SCORE: 35

## 2023-02-19 VITALS
BODY MASS INDEX: 23.41 KG/M2 | RESPIRATION RATE: 18 BRPM | WEIGHT: 124 LBS | DIASTOLIC BLOOD PRESSURE: 84 MMHG | SYSTOLIC BLOOD PRESSURE: 114 MMHG | HEIGHT: 61 IN | HEART RATE: 77 BPM | TEMPERATURE: 98.1 F | OXYGEN SATURATION: 96 %

## 2023-02-19 NOTE — ED NOTES
Patient reports ear has been plugged since December, balance has been off.  Both ear fullness, muffled hearing and vestibular have progressively gotten worse.  Patient reports tingling in first 3 fingers of left hand going up to elbow, tingling also noted in bilateral upper thighs (started in December) patient also notes have cervical issues and a spinal stimulator, unsure if related.  Patient reports not being able to get into to ENT until June 5th.

## 2023-02-19 NOTE — DISCHARGE INSTRUCTIONS
Follow-up with the ENT doctor as ordered at your prior visit.  Follow-up with physical therapy for vestibular rehabilitation.  Follow-up with audiology for recheck of your hearing.

## 2023-02-19 NOTE — ED PROVIDER NOTES
History     Chief Complaint   Patient presents with     Ear Fullness     HPI  Antoinette Nam is a 62 year old female who presents for ear fullness and in the left with dizziness.  The patient says that she has been having ear fullness and muffled hearing in the left ear for several months.  This is gotten progressively worse and then she had an episode of severe vertigo with nausea and vomiting on 2/11/2023.  She has been seen multiple times for this and has been on steroids and antibiotics without significant improvement.  She is continue to feel slightly unsteady but no falls.  No fever, chills, headache, double vision, changes in her speech.  No chest pain or difficulty breathing.  No abdominal pain, nausea, vomiting, diarrhea.    Allergies:  Allergies   Allergen Reactions     Macrobid [Nitrofuran Derivatives]      Morphine      Percocet [Oxycodone-Acetaminophen] Nausea and Vomiting       Problem List:    There are no problems to display for this patient.       Past Medical History:    No past medical history on file.    Past Surgical History:    Past Surgical History:   Procedure Laterality Date     ARTHROSCOPY KNEE RT/LT  1986/1988     CARPAL TUNNEL RELEASE RT/LT  2004     HYSTERECTOMY, JAZZ  1997     LAMINECT/DISCECTOMY, LUMBAR  1997-C5 C6    Laminectomy/Discectomy Cervical     SALPINGO OOPHORECTOMY,R/L/EDMAR  1987    Salpingo Oophorectomy, RT/LT/EDMAR       Family History:    Family History   Problem Relation Age of Onset     Cancer Mother         bladder     Arthritis Mother         degenerative     Hypertension Mother      Heart Disease Father         pacemaker     Lipids Father      Cancer Maternal Grandmother         gallbladder     Heart Disease Maternal Grandfather      Cancer Paternal Grandmother         uterine     Heart Disease Paternal Grandfather      Neurologic Disorder Brother         MS       Social History:  Marital Status:  Single [1]  Social History     Tobacco Use     Smoking status: Some  "Days     Types: Cigarettes     Smokeless tobacco: Never     Tobacco comments:     socially   Substance Use Topics     Alcohol use: Yes     Comment: wine cooler/beer once a month        Medications:    albuterol (PROAIR HFA/PROVENTIL HFA/VENTOLIN HFA) 108 (90 BASE) MCG/ACT Inhaler  BACLOFEN PO  Celecoxib (CELEBREX PO)  gabapentin (NEURONTIN) 100 MG capsule  IBUPROFEN 200 MG PO TABS  predniSONE (DELTASONE) 20 MG tablet  predniSONE (DELTASONE) 20 MG tablet          Review of Systems    Physical Exam   BP: (!) 145/86  Pulse: 92  Temp: 98.1  F (36.7  C)  Resp: 18  Height: 154.9 cm (5' 1\")  Weight: 56.2 kg (124 lb)  SpO2: 96 %      Physical Exam  Vitals and nursing note reviewed.   Constitutional:       Appearance: She is well-developed. She is not diaphoretic.   HENT:      Head: Normocephalic and atraumatic.      Right Ear: Tympanic membrane and external ear normal.      Left Ear: Tympanic membrane and external ear normal.      Nose: Nose normal.   Eyes:      General: No scleral icterus.     Conjunctiva/sclera: Conjunctivae normal.   Cardiovascular:      Rate and Rhythm: Normal rate and regular rhythm.   Pulmonary:      Effort: Pulmonary effort is normal. No respiratory distress.      Breath sounds: No stridor.   Abdominal:      General: There is no distension.      Palpations: Abdomen is soft.   Musculoskeletal:      Cervical back: Normal range of motion.   Skin:     General: Skin is warm and dry.   Neurological:      Mental Status: She is alert and oriented to person, place, and time.      GCS: GCS eye subscore is 4. GCS verbal subscore is 5. GCS motor subscore is 6.      Cranial Nerves: Cranial nerves 2-12 are intact.      Motor: No abnormal muscle tone or pronator drift.      Coordination: Finger-Nose-Finger Test normal. Rapid alternating movements normal.      Gait: Gait normal.   Psychiatric:         Behavior: Behavior normal.         ED Course                 Procedures              Critical Care time:  none      "          No results found for this or any previous visit (from the past 24 hour(s)).    Medications - No data to display    Assessments & Plan (with Medical Decision Making)   62-year-old female presents with ear fullness, decreased hearing, vertigo.  Symptoms localized to the left ear.  No signs of otitis media or mastoiditis.  She has a normal neurologic examination here, no signs of central vertigo or stroke.  Given her symptoms continues with the decreased hearing and this vertigo I do believe is likely labyrinthitis as a cause of her symptoms.  She has already been treated with steroids, no indication for further treatment with steroids.  I have placed a referral for audiology and vestibular therapy.  She already has follow-up scheduled with ENT in several months.  She is told to return if she has worsening of her symptoms or other concerns, otherwise follow-up in clinic.  The patient is in agreement with this plan.    I have reviewed the nursing notes.    I have reviewed the findings, diagnosis, plan and need for follow up with the patient.           Discharge Medication List as of 2/18/2023 11:59 PM          Final diagnoses:   Labyrinthitis of left ear       2/18/2023   [facility] EMERGENCY DEPT     Delon Garcia MD  02/19/23 0142

## 2023-02-19 NOTE — ED TRIAGE NOTES
Ongoing left ear pain and fullness. Now having continued dizziness since last ED visit. Wants more insite then has been last ED visit. Reports next ENT appointment is June.

## 2023-02-20 NOTE — TELEPHONE ENCOUNTER
02/20 LVM to schedule sooner appt/ [facility]      Per RN: She can always separate appointments to get in sooner with Dr. Reid.  Dr. Reid has new vestibular spots on her schedule that are open.  So all she would need is that audio first for the dizziness.

## 2023-04-08 ENCOUNTER — HEALTH MAINTENANCE LETTER (OUTPATIENT)
Age: 63
End: 2023-04-08

## 2023-08-08 ENCOUNTER — APPOINTMENT (OUTPATIENT)
Dept: GENERAL RADIOLOGY | Facility: CLINIC | Age: 63
End: 2023-08-08
Attending: EMERGENCY MEDICINE
Payer: COMMERCIAL

## 2023-08-08 ENCOUNTER — HOSPITAL ENCOUNTER (EMERGENCY)
Facility: CLINIC | Age: 63
Discharge: HOME | End: 2023-08-08
Attending: EMERGENCY MEDICINE | Admitting: EMERGENCY MEDICINE
Payer: COMMERCIAL

## 2023-08-08 ENCOUNTER — APPOINTMENT (OUTPATIENT)
Dept: CT IMAGING | Facility: CLINIC | Age: 63
End: 2023-08-08
Attending: EMERGENCY MEDICINE
Payer: COMMERCIAL

## 2023-08-08 VITALS
OXYGEN SATURATION: 97 % | HEART RATE: 87 BPM | SYSTOLIC BLOOD PRESSURE: 110 MMHG | HEIGHT: 62 IN | TEMPERATURE: 96.8 F | RESPIRATION RATE: 17 BRPM | WEIGHT: 123 LBS | DIASTOLIC BLOOD PRESSURE: 84 MMHG | BODY MASS INDEX: 22.63 KG/M2

## 2023-08-08 DIAGNOSIS — R13.10 ODYNOPHAGIA: ICD-10-CM

## 2023-08-08 DIAGNOSIS — R07.9 CHEST PAIN, UNSPECIFIED TYPE: ICD-10-CM

## 2023-08-08 PROBLEM — M54.81 OCCIPITAL NEURALGIA: Status: ACTIVE | Noted: 2020-02-19

## 2023-08-08 LAB
ANION GAP SERPL CALCULATED.3IONS-SCNC: 12 MMOL/L (ref 7–15)
BASOPHILS # BLD AUTO: 0.1 10E3/UL (ref 0–0.2)
BASOPHILS NFR BLD AUTO: 1 %
BUN SERPL-MCNC: 15.5 MG/DL (ref 8–23)
CALCIUM SERPL-MCNC: 9.1 MG/DL (ref 8.8–10.2)
CHLORIDE SERPL-SCNC: 99 MMOL/L (ref 98–107)
CREAT SERPL-MCNC: 0.95 MG/DL (ref 0.51–0.95)
DEPRECATED HCO3 PLAS-SCNC: 23 MMOL/L (ref 22–29)
EOSINOPHIL # BLD AUTO: 0.1 10E3/UL (ref 0–0.7)
EOSINOPHIL NFR BLD AUTO: 1 %
ERYTHROCYTE [DISTWIDTH] IN BLOOD BY AUTOMATED COUNT: 13.4 % (ref 10–15)
GFR SERPL CREATININE-BSD FRML MDRD: 67 ML/MIN/1.73M2
GLUCOSE SERPL-MCNC: 95 MG/DL (ref 70–99)
HCT VFR BLD AUTO: 38.3 % (ref 35–47)
HGB BLD-MCNC: 13.8 G/DL (ref 11.7–15.7)
HOLD SPECIMEN: NORMAL
HOLD SPECIMEN: NORMAL
IMM GRANULOCYTES # BLD: 0 10E3/UL
IMM GRANULOCYTES NFR BLD: 0 %
LYMPHOCYTES # BLD AUTO: 2 10E3/UL (ref 0.8–5.3)
LYMPHOCYTES NFR BLD AUTO: 21 %
MCH RBC QN AUTO: 32.7 PG (ref 26.5–33)
MCHC RBC AUTO-ENTMCNC: 36 G/DL (ref 31.5–36.5)
MCV RBC AUTO: 91 FL (ref 78–100)
MONOCYTES # BLD AUTO: 0.9 10E3/UL (ref 0–1.3)
MONOCYTES NFR BLD AUTO: 9 %
NEUTROPHILS # BLD AUTO: 6.5 10E3/UL (ref 1.6–8.3)
NEUTROPHILS NFR BLD AUTO: 68 %
NRBC # BLD AUTO: 0 10E3/UL
NRBC BLD AUTO-RTO: 0 /100
PLATELET # BLD AUTO: 330 10E3/UL (ref 150–450)
POTASSIUM SERPL-SCNC: 4 MMOL/L (ref 3.4–5.3)
RBC # BLD AUTO: 4.22 10E6/UL (ref 3.8–5.2)
SODIUM SERPL-SCNC: 134 MMOL/L (ref 136–145)
TROPONIN T SERPL HS-MCNC: 6 NG/L
TROPONIN T SERPL HS-MCNC: 7 NG/L
WBC # BLD AUTO: 9.7 10E3/UL (ref 4–11)

## 2023-08-08 PROCEDURE — 93010 ELECTROCARDIOGRAM REPORT: CPT | Performed by: EMERGENCY MEDICINE

## 2023-08-08 PROCEDURE — 36415 COLL VENOUS BLD VENIPUNCTURE: CPT | Performed by: EMERGENCY MEDICINE

## 2023-08-08 PROCEDURE — 250N000013 HC RX MED GY IP 250 OP 250 PS 637: Performed by: EMERGENCY MEDICINE

## 2023-08-08 PROCEDURE — 250N000011 HC RX IP 250 OP 636: Performed by: EMERGENCY MEDICINE

## 2023-08-08 PROCEDURE — 71260 CT THORAX DX C+: CPT

## 2023-08-08 PROCEDURE — 250N000009 HC RX 250: Performed by: EMERGENCY MEDICINE

## 2023-08-08 PROCEDURE — 71046 X-RAY EXAM CHEST 2 VIEWS: CPT

## 2023-08-08 PROCEDURE — 93005 ELECTROCARDIOGRAM TRACING: CPT | Performed by: EMERGENCY MEDICINE

## 2023-08-08 PROCEDURE — 80048 BASIC METABOLIC PNL TOTAL CA: CPT | Performed by: EMERGENCY MEDICINE

## 2023-08-08 PROCEDURE — 99284 EMERGENCY DEPT VISIT MOD MDM: CPT | Mod: 25 | Performed by: EMERGENCY MEDICINE

## 2023-08-08 PROCEDURE — 99285 EMERGENCY DEPT VISIT HI MDM: CPT | Mod: 25 | Performed by: EMERGENCY MEDICINE

## 2023-08-08 PROCEDURE — 85025 COMPLETE CBC W/AUTO DIFF WBC: CPT | Performed by: EMERGENCY MEDICINE

## 2023-08-08 PROCEDURE — 84484 ASSAY OF TROPONIN QUANT: CPT | Performed by: EMERGENCY MEDICINE

## 2023-08-08 RX ORDER — IOPAMIDOL 755 MG/ML
59 INJECTION, SOLUTION INTRAVASCULAR ONCE
Status: COMPLETED | OUTPATIENT
Start: 2023-08-08 | End: 2023-08-08

## 2023-08-08 RX ORDER — MAGNESIUM HYDROXIDE/ALUMINUM HYDROXICE/SIMETHICONE 120; 1200; 1200 MG/30ML; MG/30ML; MG/30ML
15 SUSPENSION ORAL ONCE
Status: COMPLETED | OUTPATIENT
Start: 2023-08-08 | End: 2023-08-08

## 2023-08-08 RX ADMIN — IOPAMIDOL 59 ML: 755 INJECTION, SOLUTION INTRAVENOUS at 20:55

## 2023-08-08 RX ADMIN — ALUMINUM HYDROXIDE, MAGNESIUM HYDROXIDE, AND SIMETHICONE 15 ML: 200; 200; 20 SUSPENSION ORAL at 20:47

## 2023-08-08 RX ADMIN — SODIUM CHLORIDE 55 ML: 9 INJECTION, SOLUTION INTRAVENOUS at 20:55

## 2023-08-08 ASSESSMENT — ACTIVITIES OF DAILY LIVING (ADL)
ADLS_ACUITY_SCORE: 35
ADLS_ACUITY_SCORE: 35

## 2023-08-09 NOTE — DISCHARGE INSTRUCTIONS
Your evaluation emergency department was reassuring.  I suspect you have esophageal irritation which should improve over the next few days.    If your symptoms continue more than 2 to 3 days, please follow-up to primary care provider.  If you develop any new or worsening symptoms, please return to the emergency department mediately for further evaluation and treatment.

## 2023-08-09 NOTE — ED TRIAGE NOTES
Pt took a drink from her ice tea at 1600 and felt like something go stuck in her throat. Feels like it''s still in there. Object unknown. Reports chest and upper back discomfort when she swallows. Pt is able to maintain airway and oral secretions. .      Triage Assessment       Row Name 08/08/23 9718       Triage Assessment (Adult)    Airway WDL WDL       Respiratory WDL    Respiratory WDL WDL       Skin Circulation/Temperature WDL    Skin Circulation/Temperature WDL WDL       Cardiac WDL    Cardiac WDL WDL       Peripheral/Neurovascular WDL    Peripheral Neurovascular WDL WDL       Cognitive/Neuro/Behavioral WDL    Cognitive/Neuro/Behavioral WDL WDL

## 2023-08-09 NOTE — ED PROVIDER NOTES
History     Chief Complaint   Patient presents with    Object stuck in throat     HPI  Antoinette Nam is a 63 year old female with history notable for hyperlipidemia, tobacco use, with throat and chest discomfort while drinking ice tea.  Occurred at about 430 today and she was drinking iced tea and she could feel something in the TV in her mouth but swallowed any way not thinking.  Felt pain and is a something was stuck in her throat.  Has a burning and tight sensation in her central chest and describes it as a chest pressure.  That has been constant since 430.  Symptoms are worse when she swallows and worse with activity.  She thought maybe she had swallowed a bee however her ice tea came from the refrigerator and was not open.  She took an Allegra-D and was going out to mow the yard.  She was driving a lawn tractor and turning the machine worsened her symptoms.  No overt dyspnea.  No nausea vomiting.  No diaphoresis.  No known cardiac history.  No cough, or abdominal pain.    The patient's PMHx, Surgical Hx, Allergies, and Medications were all reviewed with the patient.    Allergies:  Allergies   Allergen Reactions    Macrobid [Nitrofuran Derivatives]     Morphine     Percocet [Oxycodone-Acetaminophen] Nausea and Vomiting       Problem List:    Patient Active Problem List    Diagnosis Date Noted    Occipital neuralgia 02/19/2020     Priority: Medium    Hyperlipidemia 07/01/2011     Priority: Medium    Osteoporosis 06/29/2010     Priority: Medium     Formatting of this note might be different from the original. 5/10 - T-SCORE  -2.6, FRAX HIP 3.9% Consulted with endo in 2017 to discuss treatment options but declines tx at this time      Multinodular goiter 05/27/2010     Priority: Medium     Formatting of this note might be different from the original. 6/10 - Thyroid Uptake scan normal, thyroid function tests normal, and Fine Needle biopsy of two area shows benign colloid nodules.      Atony of bladder  "01/28/2007     Priority: Medium     Formatting of this note might be different from the original. intermittent caths          Past Medical History:    No past medical history on file.    Past Surgical History:    Past Surgical History:   Procedure Laterality Date    ARTHROSCOPY KNEE RT/LT  1986/1988    CARPAL TUNNEL RELEASE RT/LT  2004    HYSTERECTOMY, JAZZ  1997    LAMINECT/DISCECTOMY, LUMBAR  1997-C5 C6    Laminectomy/Discectomy Cervical    SALPINGO OOPHORECTOMY,R/L/EDMAR  1987    Salpingo Oophorectomy, RT/LT/EDMAR       Family History:    Family History   Problem Relation Age of Onset    Cancer Mother         bladder    Arthritis Mother         degenerative    Hypertension Mother     Heart Disease Father         pacemaker    Lipids Father     Cancer Maternal Grandmother         gallbladder    Heart Disease Maternal Grandfather     Cancer Paternal Grandmother         uterine    Heart Disease Paternal Grandfather     Neurologic Disorder Brother         MS       Social History:  Marital Status:  Single [1]  Social History     Tobacco Use    Smoking status: Some Days     Types: Cigarettes    Smokeless tobacco: Never    Tobacco comments:     socially   Substance Use Topics    Alcohol use: Yes     Comment: wine cooler/beer once a month        Medications:    albuterol (PROAIR HFA/PROVENTIL HFA/VENTOLIN HFA) 108 (90 BASE) MCG/ACT Inhaler  BACLOFEN PO  Celecoxib (CELEBREX PO)  gabapentin (NEURONTIN) 100 MG capsule  IBUPROFEN 200 MG PO TABS  predniSONE (DELTASONE) 20 MG tablet  predniSONE (DELTASONE) 20 MG tablet          Review of Systems  Pertinent positives and negatives mentioned in HPI    Physical Exam   BP: (!) 148/87  Pulse: 98  Temp: 96.8  F (36  C)  Resp: 16  Height: 157.5 cm (5' 2\")  Weight: 55.8 kg (123 lb)  SpO2: 98 %    Physical Exam  GEN: Awake, alert, and cooperative.  Appears uncomfortable  HENT: Erythema posterior oropharynx.  No oral lesions.    NECK: Symmetric, freely mobile.  No stridor.  No tenderness. No " Bruit.   CV : Regular rate and rhythm. Subtle early systolic murmur.   PULM: Normal effort. No wheezes, rales, or rhonchi bilaterally.  ABD: Soft, non-tender, non-distended. No rebound or guarding.   NEURO: Normal speech. Following commands. CN II-XII grossly intact. Answering questions and interacting appropriately.   EXT: No gross deformity.  No pitting pedal or pretibial edema  INT: Warm. No diaphoresis. Normal color.        ED Course        Procedures         EKG: Interpreted by myself.  Sinus rhythm with rate of 89 bpm.  Normal axis.  Normal R wave progression.  Normal intervals.  No ectopy.  No signs of ventricular hypertrophy.  Q wave in lead III which is present on prior EKG dated 2/13/2023.  Impression sinus rhythm with no evidence of acute ischemia and no acute changes.    Critical Care time:  none               Results for orders placed or performed during the hospital encounter of 08/08/23 (from the past 24 hour(s))   CBC with platelets differential    Narrative    The following orders were created for panel order CBC with platelets differential.  Procedure                               Abnormality         Status                     ---------                               -----------         ------                     CBC with platelets and d...[[id number]]                      Final result                 Please view results for these tests on the individual orders.   Basic metabolic panel   Result Value Ref Range    Sodium 134 (L) 136 - 145 mmol/L    Potassium 4.0 3.4 - 5.3 mmol/L    Chloride 99 98 - 107 mmol/L    Carbon Dioxide (CO2) 23 22 - 29 mmol/L    Anion Gap 12 7 - 15 mmol/L    Urea Nitrogen 15.5 8.0 - 23.0 mg/dL    Creatinine 0.95 0.51 - 0.95 mg/dL    Calcium 9.1 8.8 - 10.2 mg/dL    Glucose 95 70 - 99 mg/dL    GFR Estimate 67 >60 mL/min/1.73m2   Troponin T, High Sensitivity   Result Value Ref Range    Troponin T, High Sensitivity 7 <=14 ng/L   [place] Draw    Narrative    The following orders  were created for panel order [place] Draw.  Procedure                               Abnormality         Status                     ---------                               -----------         ------                     Extra Blue Top Tube[[id number]]                              Final result               Extra Red Top Tube[[id number]]                               Final result                 Please view results for these tests on the individual orders.   CBC with platelets and differential   Result Value Ref Range    WBC Count 9.7 4.0 - 11.0 10e3/uL    RBC Count 4.22 3.80 - 5.20 10e6/uL    Hemoglobin 13.8 11.7 - 15.7 g/dL    Hematocrit 38.3 35.0 - 47.0 %    MCV 91 78 - 100 fL    MCH 32.7 26.5 - 33.0 pg    MCHC 36.0 31.5 - 36.5 g/dL    RDW 13.4 10.0 - 15.0 %    Platelet Count 330 150 - 450 10e3/uL    % Neutrophils 68 %    % Lymphocytes 21 %    % Monocytes 9 %    % Eosinophils 1 %    % Basophils 1 %    % Immature Granulocytes 0 %    NRBCs per 100 WBC 0 <1 /100    Absolute Neutrophils 6.5 1.6 - 8.3 10e3/uL    Absolute Lymphocytes 2.0 0.8 - 5.3 10e3/uL    Absolute Monocytes 0.9 0.0 - 1.3 10e3/uL    Absolute Eosinophils 0.1 0.0 - 0.7 10e3/uL    Absolute Basophils 0.1 0.0 - 0.2 10e3/uL    Absolute Immature Granulocytes 0.0 <=0.4 10e3/uL    Absolute NRBCs 0.0 10e3/uL   Extra Blue Top Tube   Result Value Ref Range    Hold Specimen JIC    Extra Red Top Tube   Result Value Ref Range    Hold Specimen JIC    XR Chest 2 Views    Narrative    EXAM: XR CHEST 2 VIEWS  LOCATION: [facility]  DATE: 8/8/2023    INDICATION: patient concerned that may have swallowed foreign body which is stuck in throat. Tolerating oral secretions, please eval for radiopaque FB. Also with central chest pressure, please eval for acute cardiopulmonary process  COMPARISON: 02/13/2023      Impression    IMPRESSION:     No radiopaque foreign bodies identified.    No focal airspace disease. No pleural effusion or pneumothorax.    The  cardiomediastinal silhouette is unremarkable.    Redemonstrated wires projecting over the right hemiabdomen as well as the thoracic and cervical spine.    Multilevel degenerative changes of the spine.   CT Chest w Contrast    Narrative    EXAM: CT CHEST W CONTRAST  LOCATION: [facility]  DATE: 8/8/2023    INDICATION: Acute odynophagia. Central chest discomfort radiating to back.  COMPARISON: None.  TECHNIQUE: CT chest with IV contrast. Multiplanar reformats were obtained. Dose reduction techniques were used.    CONTRAST: 59 ml Isovue 370    FINDINGS:   LOWER NECK: Few subcentimeter thyroid nodules bilaterally.    LUNGS AND PLEURA: No focal airspace disease. No pleural effusion or pneumothorax.    MEDIASTINUM/AXILLAE: No lymphadenopathy. No pericardial effusion.    CORONARY ARTERY CALCIFICATION: None.    UPPER ABDOMEN: Unremarkable.    MUSCULOSKELETAL: Multilevel degenerative changes of the spine. Chronic appearing T12 compression fracture.      Impression    IMPRESSION:   1.  No acute findings in the chest.    2.  No radiopaque foreign bodies identified.       Medications   alum & mag hydroxide-simethicone (MAALOX) suspension 15 mL (15 mLs Oral $Given 8/8/23 2047)   iopamidol (ISOVUE-370) solution 59 mL (59 mLs Intravenous $Given 8/8/23 2055)   sodium chloride 0.9 % bag 500mL for CT scan flush use (55 mLs As instructed $Given 8/8/23 2055)       Assessments & Plan (with Medical Decision Making)   63 year old female with past medical history notable for hyperlipidemia, tobacco user, with central chest burning and tightness after concern for possibly swallowing foreign object and ice tea at 430 today.  She is tolerating oral secretions and has exacerbation of symptoms while swallowing but also with exertion.  Central chest pressure concerning for possible cardiac etiology.  EKG without evidence of acute ischemia.  High sensitive troponin T of 7 which is not elevated.  Plan for 2-hour delta  troponin chest x-ray without acute pulmonary process or radiopaque foreign body in esophagus.  CBC normal.  BMP grossly normal, sodium 134 noncontributory.  Unfortunately viscous lidocaine unavailable.  We will try GI cocktail.  Patient says pain also and it goes into her back.  I do not see any mediastinal changes on her chest imaging CT scan would have a higher resolution.  She is concerned and risks and benefits of CT scan discussed and she would like to proceed.  She does not have any difficulty swallowing or change in her speech.  Nothing to make me concerned about CVA.  Symmetric radial pulses and no focal motor or sensory change, less likely acute aortic catastrophe.  Given the timing of her symptoms and concern for swallowed foreign body could have esophageal damage but I think more likely irritation possible spasm.    CT of chest without any acute pathology identified.  2-hour repeat troponin essentially unchanged at 6 CT of chest without any acute findings and no radiopaque foreign bodies identified.  Specifically no signs of esophageal disruption.  She is still having mild symptoms but less severe than when arrived.  Given her thorough and reassuring evaluation I think she is appropriate for discharge.  Patient is comfortable with this plan.  If symptoms continue we will follow-up in clinic.  If symptoms worsen or any new or concerning symptoms develop she will return to the emergency department for further evaluation and treatment.    I have reviewed the nursing notes.         New Prescriptions    No medications on file       Final diagnoses:   Chest pain, unspecified type   Odynophagia     Lenny Meyer MD    8/8/2023   [facility] EMERGENCY DEPT    Disclaimer: This note consists of words and symbols derived from keyboarding and dictation using voice recognition software.  As a result, there may be errors that have gone undetected.  Please consider this when interpreting information  found in this note.               Lenny Meyer MD  08/14/23 [id number]

## 2023-11-18 ENCOUNTER — HEALTH MAINTENANCE LETTER (OUTPATIENT)
Age: 63
End: 2023-11-18

## 2024-06-15 ENCOUNTER — HEALTH MAINTENANCE LETTER (OUTPATIENT)
Age: 64
End: 2024-06-15

## 2025-06-13 ENCOUNTER — MEDICAL CORRESPONDENCE (OUTPATIENT)
Dept: HEALTH INFORMATION MANAGEMENT | Facility: CLINIC | Age: 65
End: 2025-06-13
Payer: COMMERCIAL

## 2025-06-14 ENCOUNTER — TRANSCRIBE ORDERS (OUTPATIENT)
Dept: OTHER | Age: 65
End: 2025-06-14

## 2025-06-14 DIAGNOSIS — H93.12: Primary | ICD-10-CM

## 2025-06-16 ENCOUNTER — PATIENT OUTREACH (OUTPATIENT)
Dept: CARE COORDINATION | Facility: CLINIC | Age: 65
End: 2025-06-16
Payer: COMMERCIAL

## 2025-07-06 ENCOUNTER — HEALTH MAINTENANCE LETTER (OUTPATIENT)
Age: 65
End: 2025-07-06